# Patient Record
Sex: MALE
[De-identification: names, ages, dates, MRNs, and addresses within clinical notes are randomized per-mention and may not be internally consistent; named-entity substitution may affect disease eponyms.]

---

## 2024-09-06 PROBLEM — Z00.00 ENCOUNTER FOR PREVENTIVE HEALTH EXAMINATION: Status: ACTIVE | Noted: 2024-09-06

## 2024-09-07 ENCOUNTER — APPOINTMENT (OUTPATIENT)
Dept: ORTHOPEDIC SURGERY | Facility: CLINIC | Age: 74
End: 2024-09-07
Payer: MEDICARE

## 2024-09-07 VITALS — HEIGHT: 66 IN | BODY MASS INDEX: 27.64 KG/M2 | WEIGHT: 172 LBS

## 2024-09-07 DIAGNOSIS — M79.18 MYALGIA, OTHER SITE: ICD-10-CM

## 2024-09-07 DIAGNOSIS — M79.672 PAIN IN LEFT FOOT: ICD-10-CM

## 2024-09-07 DIAGNOSIS — I10 ESSENTIAL (PRIMARY) HYPERTENSION: ICD-10-CM

## 2024-09-07 PROCEDURE — 73620 X-RAY EXAM OF FOOT: CPT | Mod: LT

## 2024-09-07 PROCEDURE — 73610 X-RAY EXAM OF ANKLE: CPT | Mod: RT

## 2024-09-07 NOTE — HISTORY OF PRESENT ILLNESS
[6] : 6 [5] : 5 [Dull/Aching] : dull/aching [Localized] : localized [Constant] : constant [Standing] : standing [Walking] : walking [Stairs] : stairs [Full time] : Work status: full time [] : Post Surgical Visit: no [FreeTextEntry1] : Left ankle [FreeTextEntry5] : 74M patient woke up with pain in his left medial foot, worsened on 9/5/24, no specific injury, is having pain with WB. Overall his pain is improving.  Possibly related to change in footwear to [employer] which he wore for a few weeks. No prior hx.  Local treatment to area of pain has been helpful.

## 2024-09-07 NOTE — PHYSICAL EXAM
[Left] : left foot and ankle [NL (40)] : plantar flexion 40 degrees [NL 30)] : inversion 30 degrees [NL (20)] : eversion 20 degrees [5___] : eversion 5[unfilled]/5 [2+] : posterior tibialis pulse: 2+ [] : patient ambulates without assistive device [FreeTextEntry3] : mild area medial foot/arch with inflammation, no warmth to touch [FreeTextEntry8] : tender over the medial foot/arch

## 2024-09-07 NOTE — IMAGING
[Left] : left foot [There are no fractures, subluxations or dislocations. No significant abnormalities are seen] : There are no fractures, subluxations or dislocations. No significant abnormalities are seen [de-identified] : calcifications, spur accessory navicular

## 2024-09-07 NOTE — ASSESSMENT
[FreeTextEntry1] : 74M with L Accessory Navicular Syndrome  Discussed supportive footwear, avoid direct pressure in area of pain Monitor for increased swelling/erythema RF for early return criteria Voltaren gel Ice/heat, supportive care Return with foot ankle doc for re-evaluation

## 2024-09-07 NOTE — IMAGING
[Left] : left foot [There are no fractures, subluxations or dislocations. No significant abnormalities are seen] : There are no fractures, subluxations or dislocations. No significant abnormalities are seen [de-identified] : calcifications, spur accessory navicular

## 2024-09-09 ENCOUNTER — APPOINTMENT (OUTPATIENT)
Dept: ORTHOPEDIC SURGERY | Facility: CLINIC | Age: 74
End: 2024-09-09
Payer: MEDICARE

## 2024-09-09 DIAGNOSIS — E78.00 PURE HYPERCHOLESTEROLEMIA, UNSPECIFIED: ICD-10-CM

## 2024-09-09 DIAGNOSIS — I51.9 HEART DISEASE, UNSPECIFIED: ICD-10-CM

## 2024-09-09 DIAGNOSIS — K21.9 GASTRO-ESOPHAGEAL REFLUX DISEASE W/OUT ESOPHAGITIS: ICD-10-CM

## 2024-09-09 DIAGNOSIS — M79.672 PAIN IN LEFT FOOT: ICD-10-CM

## 2024-09-09 DIAGNOSIS — Q74.2 PAIN IN LEFT FOOT: ICD-10-CM

## 2024-09-09 PROCEDURE — 99204 OFFICE O/P NEW MOD 45 MIN: CPT

## 2024-09-09 PROCEDURE — 99214 OFFICE O/P EST MOD 30 MIN: CPT

## 2024-09-09 NOTE — DISCUSSION/SUMMARY
[de-identified] : Patient likely had a strain of the synchondrosis of the accessory navicular. Symptoms have resolved. Supportive shoes with OTC arch supports are recommended to prevent recurrence. Icing/voltaren gel prn.

## 2024-09-09 NOTE — HISTORY OF PRESENT ILLNESS
[de-identified] : Patient is a 74 year old male here for his left foot. He states he woke up on 09/05/24 with medial foot pain  No trauma. Went to [facility] had x-rays taken which showed no fxs.  Since then he reports feeling much better. He has been using Voltaren gel.  No real pain at this time. [] : no [FreeTextEntry1] : left foot

## 2024-09-09 NOTE — DISCUSSION/SUMMARY
[de-identified] : Patient likely had a strain of the synchondrosis of the accessory navicular. Symptoms have resolved. Supportive shoes with OTC arch supports are recommended to prevent recurrence. Icing/voltaren gel prn.

## 2024-09-09 NOTE — PHYSICAL EXAM
[NL (40)] : plantar flexion 40 degrees [NL 30)] : inversion 30 degrees [NL (20)] : eversion 20 degrees [5___] : [facility] 5[unfilled]/5 [2+] : posterior tibialis pulse: 2+ [Normal] : saphenous nerve sensation normal [] : patient ambulates without assistive device [Left] : left foot [de-identified] : [facility] 9/7/24:  Accessory navicular, three plantar/medial calcification at medial pole of navicular.  No acute pahology. [FreeTextEntry9] : [facility] 9/7/24:  No pathology present [TWNoteComboBox7] : dorsiflexion 15 degrees

## 2024-09-09 NOTE — PHYSICAL EXAM
[NL (40)] : plantar flexion 40 degrees [NL 30)] : inversion 30 degrees [NL (20)] : eversion 20 degrees [5___] : [facility] 5[unfilled]/5 [2+] : posterior tibialis pulse: 2+ [Normal] : saphenous nerve sensation normal [] : patient ambulates without assistive device [Left] : left foot [de-identified] : [facility] 9/7/24:  Accessory navicular, three plantar/medial calcification at medial pole of navicular.  No acute pahology. [FreeTextEntry9] : [facility] 9/7/24:  No pathology present [TWNoteComboBox7] : dorsiflexion 15 degrees

## 2024-09-09 NOTE — HISTORY OF PRESENT ILLNESS
[de-identified] : Patient is a 74 year old male here for his left foot. He states he woke up on 09/05/24 with medial foot pain  No trauma. Went to [facility] had x-rays taken which showed no fxs.  Since then he reports feeling much better. He has been using Voltaren gel.  No real pain at this time. [] : no [FreeTextEntry1] : left foot